# Patient Record
Sex: FEMALE | Race: WHITE | URBAN - METROPOLITAN AREA
[De-identification: names, ages, dates, MRNs, and addresses within clinical notes are randomized per-mention and may not be internally consistent; named-entity substitution may affect disease eponyms.]

---

## 2019-04-03 ENCOUNTER — OFFICE VISIT (OUTPATIENT)
Dept: ORTHOPEDIC SURGERY | Age: 79
End: 2019-04-03

## 2019-04-03 VITALS — WEIGHT: 163 LBS | BODY MASS INDEX: 26.2 KG/M2 | HEIGHT: 66 IN

## 2019-04-03 DIAGNOSIS — M65.332 TRIGGER MIDDLE FINGER OF LEFT HAND: Primary | ICD-10-CM

## 2019-04-03 DIAGNOSIS — M65.342 TRIGGER RING FINGER OF LEFT HAND: ICD-10-CM

## 2019-04-03 PROCEDURE — 20600 DRAIN/INJ JOINT/BURSA W/O US: CPT | Performed by: ORTHOPAEDIC SURGERY

## 2019-04-03 PROCEDURE — 99203 OFFICE O/P NEW LOW 30 MIN: CPT | Performed by: ORTHOPAEDIC SURGERY

## 2019-04-03 RX ORDER — TRIAMCINOLONE ACETONIDE 40 MG/ML
10 INJECTION, SUSPENSION INTRA-ARTICULAR; INTRAMUSCULAR ONCE
Status: COMPLETED | OUTPATIENT
Start: 2019-04-03 | End: 2019-04-03

## 2019-04-03 RX ORDER — MAGNESIUM OXIDE 400 MG/1
400 TABLET ORAL DAILY
COMMUNITY

## 2019-04-03 RX ORDER — LORATADINE 10 MG/1
10 CAPSULE, LIQUID FILLED ORAL DAILY
COMMUNITY

## 2019-04-03 RX ORDER — BUDESONIDE 3 MG/1
6 CAPSULE, COATED PELLETS ORAL EVERY MORNING
COMMUNITY

## 2019-04-03 RX ORDER — CALCIUM POLYCARBOPHIL 625 MG 625 MG/1
625 TABLET ORAL DAILY
COMMUNITY

## 2019-04-03 RX ORDER — TRAZODONE HYDROCHLORIDE 100 MG/1
TABLET ORAL
COMMUNITY
Start: 2019-02-07

## 2019-04-03 RX ADMIN — TRIAMCINOLONE ACETONIDE 10 MG: 40 INJECTION, SUSPENSION INTRA-ARTICULAR; INTRAMUSCULAR at 10:14

## 2019-04-03 RX ADMIN — TRIAMCINOLONE ACETONIDE 10 MG: 40 INJECTION, SUSPENSION INTRA-ARTICULAR; INTRAMUSCULAR at 10:13

## 2019-04-03 NOTE — PROGRESS NOTES
German Jules is a [de-identified] y.o. female, who presents   Chief Complaint   Patient presents with    New Patient     New patient for left hand three fingers. Patient has had this issue for months. She states that she can not close her and or  anythng. Patient denies PT or imaging. HPI[de-identified] The problems and then present for some time. She does have a history of having a left carpal tunnel decompression done many years ago. She has a history of dropping objects time she does not have awaking type of pain at night. There is no particular weakness of the left hand she has difficulty ringing out a rag because a limitation of motion of the fingers. Wrist is still doing fine and elbow and shoulder are without problems. She does have a history of neuropathy in the lower extremities but not in the upper. She is not diabetic. Allergies; medications; past medical, surgical, family, and social history; and problem list have been reviewed today and updated as indicated in this encounter - see below following Ortho specifics. Musculoskeletal: Skin condition gross neurovascular function are good in the left upper extremity. There is some  smoothness of the fingertips more related to age. There is some muscle mass loss and some deformity from degenerative changes in the thumb rays of both hands. Overall fingers move well though she is reluctant to move I think not understanding that she can move these. She is having some discomfort with full flexion such as curling fingers up as if she were holding a pencil. On exam gross sensation is good. Circulation is good. And as mentioned range of motion is possible if she is encouraged to start with the fingertips and then all small joints before bending the MCP joints. There is prominence in the palm in the A1 band areas of the ring and middle fingers of the left hand and a little bit of crepitus with extension of the fingers. There is no instability of the joints.  Wrist and elbow motion are full. ASSESSMENT:  Jose Serrano was seen today for new patient. Diagnoses and all orders for this visit:    Trigger middle finger of left hand    Trigger ring finger of left hand     Treatment alternatives were reviewed including medical and physical therapies, injections, and surgical options, expected risks benefits and likely outcome of each were discussed in detail, questions asked and answered and understood. At further discussion of the options is Bernard Jean indicated she like to have injections which helped her left thumb in the past.    PLAN: Injection of the flexor sheaths of the left middle and ring fingers at the A1 band. This was accomplished using appropriate aseptic technique without incident. Her fingers were taped to remind her that local anesthetic effect last for an hour or hour and a half.  She'll follow up as needed        Patient Active Problem List   Diagnosis    Trigger thumb of left hand       Past Medical History:   Diagnosis Date    CAD (coronary artery disease)     Cancer ([facility] [address] 2015    left arm melanoma    Diabetes mellitus ([facility] [address]     diet controlled    Hypertension     Irregular heart beat        Past Surgical History:   Procedure Laterality Date    CHOLECYSTECTOMY      COLONOSCOPY      CORONARY ANGIOPLASTY WITH STENT PLACEMENT  2003    x 1    EYE SURGERY Bilateral     cataracts    FINGER TRIGGER RELEASE Left 7 23 15    thumb    FOOT SURGERY      HEMORRHOID SURGERY      HYSTERECTOMY      SKIN BIOPSY Left     forearm melanoma removed       Current Outpatient Medications   Medication Sig Dispense Refill    traZODone (DESYREL) 100 MG tablet       budesonide (ENTOCORT EC) 3 MG extended release capsule Take 6 mg by mouth every morning      polycarbophil (FIBERCON) 625 MG tablet Take 625 mg by mouth daily      loratadine (CLARITIN) 10 MG capsule Take 10 mg by mouth daily      magnesium oxide (MAG-OX) 400 MG tablet Take 400 mg by mouth daily      Multiple